# Patient Record
Sex: FEMALE | Race: WHITE | NOT HISPANIC OR LATINO | Employment: FULL TIME | URBAN - NONMETROPOLITAN AREA
[De-identification: names, ages, dates, MRNs, and addresses within clinical notes are randomized per-mention and may not be internally consistent; named-entity substitution may affect disease eponyms.]

---

## 2023-07-26 LAB
ABO GROUP (TYPE) IN BLOOD: NORMAL
ANTIBODY SCREEN: NORMAL
CHORIOGONADOTROPIN (MIU/ML) IN SER/PLAS: ABNORMAL MIU/ML
ERYTHROCYTE DISTRIBUTION WIDTH (RATIO) BY AUTOMATED COUNT: 12.7 % (ref 11.5–14.5)
ERYTHROCYTE MEAN CORPUSCULAR HEMOGLOBIN CONCENTRATION (G/DL) BY AUTOMATED: 33.2 G/DL (ref 32–36)
ERYTHROCYTE MEAN CORPUSCULAR VOLUME (FL) BY AUTOMATED COUNT: 93 FL (ref 80–100)
ERYTHROCYTES (10*6/UL) IN BLOOD BY AUTOMATED COUNT: 4.24 X10E12/L (ref 4–5.2)
HEMATOCRIT (%) IN BLOOD BY AUTOMATED COUNT: 39.5 % (ref 36–46)
HEMOGLOBIN (G/DL) IN BLOOD: 13.1 G/DL (ref 12–16)
LEUKOCYTES (10*3/UL) IN BLOOD BY AUTOMATED COUNT: 6.8 X10E9/L (ref 4.4–11.3)
PLATELETS (10*3/UL) IN BLOOD AUTOMATED COUNT: 269 X10E9/L (ref 150–450)
REFLEX ADDED, ANEMIA PANEL: NORMAL
RH FACTOR: NORMAL

## 2023-08-01 ENCOUNTER — HOSPITAL ENCOUNTER (OUTPATIENT)
Dept: DATA CONVERSION | Facility: HOSPITAL | Age: 23
End: 2023-08-01
Attending: OBSTETRICS & GYNECOLOGY

## 2023-08-01 DIAGNOSIS — Z3A.01 LESS THAN 8 WEEKS GESTATION OF PREGNANCY (HHS-HCC): ICD-10-CM

## 2023-08-01 DIAGNOSIS — O21.0 MILD HYPEREMESIS GRAVIDARUM (HHS-HCC): ICD-10-CM

## 2023-08-01 LAB
ALANINE AMINOTRANSFERASE (SGPT) (U/L) IN SER/PLAS: 23 U/L (ref 7–45)
ALBUMIN (G/DL) IN SER/PLAS: 4.3 G/DL (ref 3.4–5)
ALKALINE PHOSPHATASE (U/L) IN SER/PLAS: 29 U/L (ref 33–110)
ANION GAP IN SER/PLAS: 10 MMOL/L (ref 10–20)
APPEARANCE, URINE: ABNORMAL
ASPARTATE AMINOTRANSFERASE (SGOT) (U/L) IN SER/PLAS: 18 U/L (ref 9–39)
BACTERIA, URINE: ABNORMAL /HPF
BILIRUBIN TOTAL (MG/DL) IN SER/PLAS: 0.5 MG/DL (ref 0–1.2)
BILIRUBIN, URINE: NEGATIVE
BLOOD, URINE: NEGATIVE
CALCIUM (MG/DL) IN SER/PLAS: 9.1 MG/DL (ref 8.6–10.3)
CARBON DIOXIDE, TOTAL (MMOL/L) IN SER/PLAS: 25 MMOL/L (ref 21–32)
CHLORIDE (MMOL/L) IN SER/PLAS: 104 MMOL/L (ref 98–107)
COLOR, URINE: ABNORMAL
CREATININE (MG/DL) IN SER/PLAS: 0.59 MG/DL (ref 0.5–1.05)
ERYTHROCYTE DISTRIBUTION WIDTH (RATIO) BY AUTOMATED COUNT: 12.5 % (ref 11.5–14.5)
ERYTHROCYTE MEAN CORPUSCULAR HEMOGLOBIN CONCENTRATION (G/DL) BY AUTOMATED: 34.3 G/DL (ref 32–36)
ERYTHROCYTE MEAN CORPUSCULAR VOLUME (FL) BY AUTOMATED COUNT: 91 FL (ref 80–100)
ERYTHROCYTES (10*6/UL) IN BLOOD BY AUTOMATED COUNT: 4.18 X10E12/L (ref 4–5.2)
GFR FEMALE: >90 ML/MIN/1.73M2
GLUCOSE (MG/DL) IN SER/PLAS: 78 MG/DL (ref 74–99)
GLUCOSE, URINE: NEGATIVE MG/DL
HEMATOCRIT (%) IN BLOOD BY AUTOMATED COUNT: 38.2 % (ref 36–46)
HEMOGLOBIN (G/DL) IN BLOOD: 13.1 G/DL (ref 12–16)
KETONES, URINE: NEGATIVE MG/DL
LEUKOCYTE ESTERASE, URINE: ABNORMAL
LEUKOCYTES (10*3/UL) IN BLOOD BY AUTOMATED COUNT: 8.2 X10E9/L (ref 4.4–11.3)
MUCUS, URINE: ABNORMAL /LPF
NITRITE, URINE: NEGATIVE
PH, URINE: 7 (ref 5–8)
PLATELETS (10*3/UL) IN BLOOD AUTOMATED COUNT: 260 X10E9/L (ref 150–450)
POTASSIUM (MMOL/L) IN SER/PLAS: 4 MMOL/L (ref 3.5–5.3)
PROTEIN TOTAL: 7.3 G/DL (ref 6.4–8.2)
PROTEIN, URINE: ABNORMAL MG/DL
RBC, URINE: 5 /HPF (ref 0–5)
SODIUM (MMOL/L) IN SER/PLAS: 135 MMOL/L (ref 136–145)
SPECIFIC GRAVITY, URINE: 1.03 (ref 1–1.03)
SQUAMOUS EPITHELIAL CELLS, URINE: 18 /HPF
UREA NITROGEN (MG/DL) IN SER/PLAS: 11 MG/DL (ref 6–23)
UROBILINOGEN, URINE: <2 MG/DL (ref 0–1.9)
WBC CLUMPS, URINE: ABNORMAL /HPF
WBC, URINE: 34 /HPF (ref 0–5)

## 2023-09-29 VITALS — WEIGHT: 129.19 LBS | BODY MASS INDEX: 22.88 KG/M2

## 2023-09-30 NOTE — PROGRESS NOTES
Current Stage:   Stage: Triage     Subjective Data:   Antepartum:  Antepartum:    Patient presented to labor and delivery with hyperemesis      Objective Information:    Objective Information:      T   P  R  BP   MAP  SpO2   Value  37  80  16  106/58   76  99%  Date/Time 8/1 14:30 8/1 16:40 8/1 14:30 8/1 16:40  8/1 16:40 8/1 14:30  Range  (37C - 37C )  (76 - 80 )  (16 - 16 )  (106 - 110 )/ (58 - 71 )  (76 - 84 )  (99% - 99% )  Highest temp of 37 C was recorded at 8/1 14:30    Recent Lab Results:    Results:    CBC: 8/1/2023 15:04              \     Hgb     /                              \     13.1       /  WBC  ----------------  Plt               8.2       ----------------    260              /     Hct     \                              /     38.2       \            RBC: 4.18     MCV: 91           CMP: 8/1/2023 15:04  NA+        Cl-     BUN  /                         135 L   104    11  /  --------------------------------  Glucose                ---------------------------  78    K+     HCO3-   Creat \                         4.0    25    0.59  \           \  T Bili  /                    \  0.5  /  AST  x ---- x ALT        18 x ---- x 23         /  Alk P   \               /  29 L \  Calcium : 9.1     Anion Gap : 10     Albumin : 4.3     T Protein : 7.3           Assessment and Plan:   Assessment:    Patient with hyperemesis presented to labor and delivery.  Treated with IV fluids Zofran.  Labs were unremarkable once able to tolerate p.o. patient was discharged  home.      Electronic Signatures:  Loulou Chase)  (Signed 01-Aug-2023 16:45)   Authored: Current Stage, Subjective Data, Objective Data,  Assessment and Plan, Note Completion      Last Updated: 01-Aug-2023 16:45 by Loulou Chase)

## 2024-03-06 ENCOUNTER — HOSPITAL ENCOUNTER (INPATIENT)
Age: 24
LOS: 3 days | Discharge: HOME | DRG: 540 | End: 2024-03-09
Payer: MEDICAID

## 2024-03-06 VITALS
SYSTOLIC BLOOD PRESSURE: 98 MMHG | HEART RATE: 89 BPM | TEMPERATURE: 97.16 F | DIASTOLIC BLOOD PRESSURE: 58 MMHG | RESPIRATION RATE: 16 BRPM | OXYGEN SATURATION: 98 %

## 2024-03-06 VITALS
SYSTOLIC BLOOD PRESSURE: 122 MMHG | DIASTOLIC BLOOD PRESSURE: 75 MMHG | TEMPERATURE: 98.4 F | RESPIRATION RATE: 18 BRPM | OXYGEN SATURATION: 98 % | HEART RATE: 99 BPM

## 2024-03-06 VITALS
HEART RATE: 82 BPM | SYSTOLIC BLOOD PRESSURE: 111 MMHG | DIASTOLIC BLOOD PRESSURE: 70 MMHG | TEMPERATURE: 98.96 F | RESPIRATION RATE: 17 BRPM | OXYGEN SATURATION: 98 %

## 2024-03-06 VITALS
DIASTOLIC BLOOD PRESSURE: 67 MMHG | TEMPERATURE: 98.42 F | RESPIRATION RATE: 18 BRPM | SYSTOLIC BLOOD PRESSURE: 110 MMHG | HEART RATE: 100 BPM | OXYGEN SATURATION: 98 %

## 2024-03-06 VITALS — BODY MASS INDEX: 30.9 KG/M2

## 2024-03-06 DIAGNOSIS — Z3A.38: ICD-10-CM

## 2024-03-06 DIAGNOSIS — G90.A: ICD-10-CM

## 2024-03-06 DIAGNOSIS — O61.0: ICD-10-CM

## 2024-03-06 DIAGNOSIS — E78.79: ICD-10-CM

## 2024-03-06 DIAGNOSIS — K76.89: ICD-10-CM

## 2024-03-06 LAB
AMPHET UR-MCNC: NEGATIVE NG/ML
BARBITURATE URINE VISTA: NEGATIVE
BENZODIAZEPINE URINE VISTA: NEGATIVE
COCAINE URINE VISTA: NEGATIVE
DEPRECATED RDW RBC: 45.4 FL (ref 35.1–43.9)
DRUG CONFIRMATION TO FOLLOW?: (no result)
ECSTACY URINE VISTA: NEGATIVE
ERYTHROCYTE [DISTWIDTH] IN BLOOD: 14.3 % (ref 11.6–14.6)
HCT VFR BLD AUTO: 31.2 % (ref 37–47)
HGB BLD-MCNC: 10.1 G/DL (ref 12–15)
IMMATURE GRANULOCYTES COUNT: 0.09 X10^3/UL (ref 0–0)
MCV RBC: 86.9 FL (ref 81–99)
MEAN CORP HGB CONC: 32.4 G/DL (ref 32–36)
MEAN PLATELET VOL.: 10.9 FL (ref 6.2–12)
METHADONE URINE VISTA: NEGATIVE
NRBC FLAGGED BY ANALYZER: 0 % (ref 0–5)
PCP UR QL: NEGATIVE
PH UR: 6 [PH]
PLATELET # BLD: 332 K/MM3 (ref 150–450)
RBC # BLD AUTO: 3.59 M/MM3 (ref 4.2–5.4)
THC URINE VISTA: NEGATIVE
WBC # BLD AUTO: 6.5 K/MM3 (ref 4.4–11)

## 2024-03-06 PROCEDURE — 86901 BLOOD TYPING SEROLOGIC RH(D): CPT

## 2024-03-06 PROCEDURE — 59025 FETAL NON-STRESS TEST: CPT

## 2024-03-06 PROCEDURE — G0378 HOSPITAL OBSERVATION PER HR: HCPCS

## 2024-03-06 PROCEDURE — 86850 RBC ANTIBODY SCREEN: CPT

## 2024-03-06 PROCEDURE — 85025 COMPLETE CBC W/AUTO DIFF WBC: CPT

## 2024-03-06 PROCEDURE — 86900 BLOOD TYPING SEROLOGIC ABO: CPT

## 2024-03-06 PROCEDURE — 99221 1ST HOSP IP/OBS SF/LOW 40: CPT

## 2024-03-06 PROCEDURE — 80307 DRUG TEST PRSMV CHEM ANLYZR: CPT

## 2024-03-06 PROCEDURE — 86780 TREPONEMA PALLIDUM: CPT

## 2024-03-06 PROCEDURE — 59050 FETAL MONITOR W/REPORT: CPT

## 2024-03-06 PROCEDURE — 85027 COMPLETE CBC AUTOMATED: CPT

## 2024-03-06 NOTE — PCM.HP.OB
HPI - General
General
Date of Admission: 24
HPI Narrative
ALEN ZAMBRANO, is a 23 F  at 38.2 weeks gestation who presents for scheduled induction of labor for cholestasis.


Maternal Data
Information
JAYLEEN Calculator
 Estimated Delivery Date Method Current WG 
Current Estimate 24 Manual 38w 2d 


[facility]
Medical History (Updated 24 @ 20:20 by Marla Clifford CNM)

Anxiety
Autoimmune disease
Depression
History of depression
Seizures


Home Medications

PNV#14-iron fum-FA#1-dha-docusate 27 mg iron-1 mg-300 mg-50 mg capsule 1 cap PO DAILY Pregnancy 24 [History Last Taken 24 08:00 1 cap]
aspirin 81 mg chewable tablet (Hannah Chewable Low Dose Aspirin) 1 tab PO DAILY pregnancy 24 [History Last Taken 24 08:00 1 TAB]




Allergy/AdvReac Type Severity Reaction Status Date / Time
No Known Allergies Allergy   Verified 24 19:35



ROS
Eyes
Eyes: Denies blurry vision, change in vision or spots in vision
ENT
HEENT: Denies dizziness or headache(s)
Cardiovascular
Cardiovascular: Denies abdominal pain, chest pain or dyspnea
Respiratory/Chest
Respiratory/Chest: Denies cough, dyspnea, shortness of breath at rest or shortness of breath with exertion
Gastrointestinal
Gastrointestinal: Denies abdominal pain, diarrhea or vomiting
Genitourinary
Genitourinary: Denies change in urinary stream, difficulty urinating or dysuria
Musculoskeletal
Musculoskeletal: Reports none
Integumentary
Integumentary: Denies rash
Neurologic
Neurologic: Denies dizziness, headache(s), memory loss or weakness
Psychiatric
Psychiatric: Reports none

Vital Signs
Vital Signs
Vital Signs: 


 24
20:09 24
20:09 24
20:09
Pulse Rate  92 
Blood Pressure 122/75 H  
BP Systolic 122  
BP Diastolic 75  
Pulse Ox   98

Weight

Weight:                        169 lb 4 oz                                       
Body Mass Index (BMI)          30.9                                              




Physical Exam
Const
alert, oriented x3 and no apparent distress
General Appearance: cooperative
Orientation / Consciousness: awake
Exam Limitations: no limitations
HEENT
normocephalic
Head and Scalp: normal to inspection
Eyes
General Eye: normal appearance of both eyes
Neck
full ROM and no lymphadenopathy
Lymph
Lymphatic: no lymphadenopathy noted
Chest
inspection of chest normal
Resp
normal respiratory effort, normal air movement and clear to auscultation bilaterally
Effort and Inspection: able to speak in complete sentences and symmetric chest movement
Cardio
regular rate and regular rhythm
GI
normal to inspection, nondistended, normoactive bowel sounds

Manual OB Exam: fetal presentation cephalic
Back/Spine
normal ROM
Extremity
full ROM and no calf tenderness
Skin
no rashes or lesions noted
General Skin Exam: no breakdown
Neuro
oriented x3 and CN's II-XII intact bilaterally
Psych
mental status grossly normal and thought process normal

Prenatal Labs
Prenatal Labs
Prenatal Labs: 
      No Data to Display


Assessment & Plan
(1) 38 weeks gestation of pregnancy: 
(2) Cholestasis during pregnancy: 
(3) POTS (postural orthostatic tachycardia syndrome): 
(4) History of depression: 
(5) History of marijuana use: 
(6) Encounter for induction of labor: 
PLAN: 
Plan
Admit to labor and delivery
Routine labs
GBS negative
CE 50/-3
Sequeira bulb placed without difficulty and filled with 30 cc N/S
Vistaril 25 mg PO PRN every 8 hours for itching as needed
Epidural when indicated
Pitocin 2mu/min IV and increase per orders
Dr. Pathak aware of admission and plan of care and is collaborating physician

## 2024-03-06 NOTE — HP.PCM.OB_ITS
HPI - General    
General    
Date of Admission: 24    
HPI Narrative    
ALEN ZAMBRANO, is a 23 F  at 38.2 weeks gestation who presents for scheduled  
induction of labor for cholestasis.    
    
    
Maternal Data    
Information    
JAYLEEN Calculator    
    
    
                                Estimated Delivery Date Method          Current     
WG    
     
             Current Estimate 24     Manual       38w 2d           
    
    
    
[facility]    
Medical History (Updated 24 @ 20:20 by Marla Clifford CNM)    
    
Anxiety    
Autoimmune disease    
Depression    
History of depression    
Seizures    
    
    
                                Home Medications    
    
PNV#14-iron fum-FA#1-dha-docusate 27 mg iron-1 mg-300 mg-50 mg capsule 1 cap PO   
DAILY Pregnancy 24 [History Last Taken 24 08:00 1 cap]    
aspirin 81 mg chewable tablet (Hannah Chewable Low Dose Aspirin) 1 tab PO DAILY   
pregnancy 24 [History Last Taken 24 08:00 1 TAB]    
    
    
                                            
    
    
    
Allergy/AdvReac Type Severity Reaction Status Date / Time    
     
No Known Allergies Allergy   Verified 24 19:35    
    
    
    
    
ROS    
Eyes    
Eyes: Denies blurry vision, change in vision or spots in vision    
ENT    
HEENT: Denies dizziness or headache(s)    
Cardiovascular    
Cardiovascular: Denies abdominal pain, chest pain or dyspnea    
Respiratory/Chest    
Respiratory/Chest: Denies cough, dyspnea, shortness of breath at rest or   
shortness of breath with exertion    
Gastrointestinal    
Gastrointestinal: Denies abdominal pain, diarrhea or vomiting    
Genitourinary    
Genitourinary: Denies change in urinary stream, difficulty urinating or dysuria    
Musculoskeletal    
Musculoskeletal: Reports none    
Integumentary    
Integumentary: Denies rash    
Neurologic    
Neurologic: Denies dizziness, headache(s), memory loss or weakness    
Psychiatric    
Psychiatric: Reports none    
    
Vital Signs    
Vital Signs    
Vital Signs:     
                                            
    
    
    
 24    
20:09 24    
20:09 24    
20:09    
     
Pulse Rate  92     
     
Blood Pressure 122/75 H      
     
BP Systolic 122      
     
BP Diastolic 75      
     
Pulse Ox   98    
    
    
                                     Weight    
    
    
    
Weight:                        169 lb 4 oz                                        
    
     
Body Mass Index (BMI)          30.9                                               
    
    
    
    
    
    
Physical Exam    
Const    
alert, oriented x3 and no apparent distress    
General Appearance: cooperative    
Orientation / Consciousness: awake    
Exam Limitations: no limitations    
HEENT    
normocephalic    
Head and Scalp: normal to inspection    
Eyes    
General Eye: normal appearance of both eyes    
Neck    
full ROM and no lymphadenopathy    
Lymph    
Lymphatic: no lymphadenopathy noted    
Chest    
inspection of chest normal    
Resp    
normal respiratory effort, normal air movement and clear to auscultation   
bilaterally    
Effort and Inspection: able to speak in complete sentences and symmetric chest   
movement    
Cardio    
regular rate and regular rhythm    
GI    
normal to inspection, nondistended, normoactive bowel sounds    
    
Manual OB Exam: fetal presentation cephalic    
Back/Spine    
normal ROM    
Extremity    
full ROM and no calf tenderness    
Skin    
no rashes or lesions noted    
General Skin Exam: no breakdown    
Neuro    
oriented x3 and CN's II-XII intact bilaterally    
Psych    
mental status grossly normal and thought process normal    
    
Prenatal Labs    
Prenatal Labs    
Prenatal Labs:     
    
    
                                        No Data to Display    
    
    
    
Assessment & Plan    
(1) 38 weeks gestation of pregnancy:     
(2) Cholestasis during pregnancy:     
(3) POTS (postural orthostatic tachycardia syndrome):     
(4) History of depression:     
(5) History of marijuana use:     
(6) Encounter for induction of labor:     
PLAN:     
Plan    
Admit to labor and delivery    
Routine labs    
GBS negative    
CE 50/-3    
Sequeira bulb placed without difficulty and filled with 30 cc N/S    
Vistaril 25 mg PO PRN every 8 hours for itching as needed    
Epidural when indicated    
Pitocin 2mu/min IV and increase per orders    
Dr. Pathak aware of admission and plan of care and is collaborating physician

## 2024-03-07 VITALS — OXYGEN SATURATION: 100 % | HEART RATE: 85 BPM

## 2024-03-07 VITALS — HEART RATE: 86 BPM | SYSTOLIC BLOOD PRESSURE: 114 MMHG | DIASTOLIC BLOOD PRESSURE: 65 MMHG | OXYGEN SATURATION: 98 %

## 2024-03-07 VITALS
RESPIRATION RATE: 16 BRPM | OXYGEN SATURATION: 97 % | DIASTOLIC BLOOD PRESSURE: 66 MMHG | TEMPERATURE: 98.42 F | HEART RATE: 111 BPM | SYSTOLIC BLOOD PRESSURE: 127 MMHG

## 2024-03-07 VITALS
SYSTOLIC BLOOD PRESSURE: 99 MMHG | RESPIRATION RATE: 16 BRPM | HEART RATE: 93 BPM | DIASTOLIC BLOOD PRESSURE: 57 MMHG | TEMPERATURE: 98.6 F

## 2024-03-07 VITALS — OXYGEN SATURATION: 98 % | HEART RATE: 98 BPM

## 2024-03-07 VITALS
DIASTOLIC BLOOD PRESSURE: 77 MMHG | TEMPERATURE: 97.16 F | OXYGEN SATURATION: 95 % | HEART RATE: 120 BPM | SYSTOLIC BLOOD PRESSURE: 131 MMHG | RESPIRATION RATE: 19 BRPM

## 2024-03-07 VITALS
OXYGEN SATURATION: 96 % | DIASTOLIC BLOOD PRESSURE: 82 MMHG | TEMPERATURE: 98.24 F | SYSTOLIC BLOOD PRESSURE: 131 MMHG | RESPIRATION RATE: 16 BRPM | HEART RATE: 125 BPM

## 2024-03-07 VITALS — HEART RATE: 84 BPM | OXYGEN SATURATION: 98 %

## 2024-03-07 VITALS
OXYGEN SATURATION: 97 % | TEMPERATURE: 97.6 F | DIASTOLIC BLOOD PRESSURE: 70 MMHG | RESPIRATION RATE: 16 BRPM | SYSTOLIC BLOOD PRESSURE: 117 MMHG | HEART RATE: 126 BPM

## 2024-03-07 VITALS — HEART RATE: 92 BPM | DIASTOLIC BLOOD PRESSURE: 73 MMHG | SYSTOLIC BLOOD PRESSURE: 116 MMHG | RESPIRATION RATE: 19 BRPM

## 2024-03-07 VITALS
OXYGEN SATURATION: 94 % | HEART RATE: 115 BPM | SYSTOLIC BLOOD PRESSURE: 124 MMHG | DIASTOLIC BLOOD PRESSURE: 66 MMHG | RESPIRATION RATE: 2 BRPM

## 2024-03-07 VITALS — OXYGEN SATURATION: 98 % | HEART RATE: 92 BPM

## 2024-03-07 VITALS — RESPIRATION RATE: 18 BRPM | HEART RATE: 111 BPM | SYSTOLIC BLOOD PRESSURE: 111 MMHG | DIASTOLIC BLOOD PRESSURE: 75 MMHG

## 2024-03-07 VITALS — HEART RATE: 95 BPM | OXYGEN SATURATION: 97 %

## 2024-03-07 VITALS
RESPIRATION RATE: 16 BRPM | DIASTOLIC BLOOD PRESSURE: 55 MMHG | SYSTOLIC BLOOD PRESSURE: 110 MMHG | TEMPERATURE: 98.78 F | HEART RATE: 85 BPM | OXYGEN SATURATION: 97 %

## 2024-03-07 VITALS — HEART RATE: 95 BPM | OXYGEN SATURATION: 99 % | DIASTOLIC BLOOD PRESSURE: 69 MMHG | SYSTOLIC BLOOD PRESSURE: 116 MMHG

## 2024-03-07 VITALS — DIASTOLIC BLOOD PRESSURE: 66 MMHG | HEART RATE: 104 BPM | SYSTOLIC BLOOD PRESSURE: 127 MMHG

## 2024-03-07 VITALS
TEMPERATURE: 98.24 F | DIASTOLIC BLOOD PRESSURE: 66 MMHG | RESPIRATION RATE: 20 BRPM | SYSTOLIC BLOOD PRESSURE: 113 MMHG | OXYGEN SATURATION: 94 % | HEART RATE: 110 BPM

## 2024-03-07 VITALS — OXYGEN SATURATION: 98 % | HEART RATE: 91 BPM

## 2024-03-07 VITALS — HEART RATE: 100 BPM | OXYGEN SATURATION: 99 %

## 2024-03-07 VITALS
SYSTOLIC BLOOD PRESSURE: 112 MMHG | OXYGEN SATURATION: 99 % | TEMPERATURE: 97.52 F | DIASTOLIC BLOOD PRESSURE: 68 MMHG | HEART RATE: 72 BPM | RESPIRATION RATE: 16 BRPM

## 2024-03-07 VITALS
TEMPERATURE: 99.32 F | SYSTOLIC BLOOD PRESSURE: 112 MMHG | RESPIRATION RATE: 16 BRPM | DIASTOLIC BLOOD PRESSURE: 72 MMHG | HEART RATE: 100 BPM

## 2024-03-07 VITALS — SYSTOLIC BLOOD PRESSURE: 121 MMHG | DIASTOLIC BLOOD PRESSURE: 81 MMHG | HEART RATE: 112 BPM | RESPIRATION RATE: 18 BRPM

## 2024-03-07 VITALS — SYSTOLIC BLOOD PRESSURE: 101 MMHG | HEART RATE: 88 BPM | DIASTOLIC BLOOD PRESSURE: 63 MMHG | RESPIRATION RATE: 16 BRPM

## 2024-03-07 VITALS
SYSTOLIC BLOOD PRESSURE: 121 MMHG | DIASTOLIC BLOOD PRESSURE: 66 MMHG | OXYGEN SATURATION: 94 % | RESPIRATION RATE: 19 BRPM | HEART RATE: 117 BPM

## 2024-03-07 VITALS — RESPIRATION RATE: 18 BRPM | SYSTOLIC BLOOD PRESSURE: 124 MMHG | HEART RATE: 102 BPM | DIASTOLIC BLOOD PRESSURE: 78 MMHG

## 2024-03-07 VITALS
TEMPERATURE: 99 F | SYSTOLIC BLOOD PRESSURE: 113 MMHG | RESPIRATION RATE: 16 BRPM | DIASTOLIC BLOOD PRESSURE: 70 MMHG | HEART RATE: 95 BPM

## 2024-03-07 VITALS — DIASTOLIC BLOOD PRESSURE: 57 MMHG | SYSTOLIC BLOOD PRESSURE: 98 MMHG | HEART RATE: 90 BPM | RESPIRATION RATE: 16 BRPM

## 2024-03-07 VITALS — DIASTOLIC BLOOD PRESSURE: 57 MMHG | SYSTOLIC BLOOD PRESSURE: 101 MMHG | RESPIRATION RATE: 16 BRPM | HEART RATE: 95 BPM

## 2024-03-07 VITALS — OXYGEN SATURATION: 99 % | HEART RATE: 107 BPM

## 2024-03-07 VITALS — RESPIRATION RATE: 16 BRPM | HEART RATE: 83 BPM | SYSTOLIC BLOOD PRESSURE: 106 MMHG | DIASTOLIC BLOOD PRESSURE: 56 MMHG

## 2024-03-07 VITALS
TEMPERATURE: 98.4 F | OXYGEN SATURATION: 96 % | DIASTOLIC BLOOD PRESSURE: 55 MMHG | SYSTOLIC BLOOD PRESSURE: 115 MMHG | HEART RATE: 89 BPM | RESPIRATION RATE: 16 BRPM

## 2024-03-07 VITALS — OXYGEN SATURATION: 100 % | HEART RATE: 107 BPM

## 2024-03-07 VITALS — HEART RATE: 90 BPM | OXYGEN SATURATION: 100 %

## 2024-03-07 VITALS
RESPIRATION RATE: 16 BRPM | HEART RATE: 125 BPM | OXYGEN SATURATION: 97 % | SYSTOLIC BLOOD PRESSURE: 115 MMHG | DIASTOLIC BLOOD PRESSURE: 81 MMHG | TEMPERATURE: 98.24 F

## 2024-03-07 VITALS
RESPIRATION RATE: 14 BRPM | SYSTOLIC BLOOD PRESSURE: 111 MMHG | DIASTOLIC BLOOD PRESSURE: 66 MMHG | OXYGEN SATURATION: 94 % | HEART RATE: 115 BPM

## 2024-03-07 VITALS — OXYGEN SATURATION: 100 % | HEART RATE: 92 BPM

## 2024-03-07 VITALS — DIASTOLIC BLOOD PRESSURE: 69 MMHG | RESPIRATION RATE: 19 BRPM | SYSTOLIC BLOOD PRESSURE: 127 MMHG | HEART RATE: 94 BPM

## 2024-03-07 VITALS — OXYGEN SATURATION: 93 % | HEART RATE: 109 BPM

## 2024-03-07 VITALS
SYSTOLIC BLOOD PRESSURE: 111 MMHG | RESPIRATION RATE: 16 BRPM | OXYGEN SATURATION: 97 % | TEMPERATURE: 98.42 F | HEART RATE: 113 BPM | DIASTOLIC BLOOD PRESSURE: 82 MMHG

## 2024-03-07 VITALS — HEART RATE: 99 BPM | OXYGEN SATURATION: 97 %

## 2024-03-07 VITALS
OXYGEN SATURATION: 95 % | RESPIRATION RATE: 16 BRPM | DIASTOLIC BLOOD PRESSURE: 70 MMHG | HEART RATE: 119 BPM | SYSTOLIC BLOOD PRESSURE: 127 MMHG

## 2024-03-07 VITALS — HEART RATE: 94 BPM | OXYGEN SATURATION: 97 %

## 2024-03-07 VITALS — OXYGEN SATURATION: 92 % | HEART RATE: 99 BPM | TEMPERATURE: 98.2 F

## 2024-03-07 VITALS — HEART RATE: 120 BPM | OXYGEN SATURATION: 97 % | RESPIRATION RATE: 16 BRPM

## 2024-03-07 VITALS — DIASTOLIC BLOOD PRESSURE: 56 MMHG | RESPIRATION RATE: 16 BRPM | SYSTOLIC BLOOD PRESSURE: 102 MMHG | HEART RATE: 90 BPM

## 2024-03-07 VITALS
SYSTOLIC BLOOD PRESSURE: 109 MMHG | HEART RATE: 104 BPM | TEMPERATURE: 97.88 F | DIASTOLIC BLOOD PRESSURE: 76 MMHG | RESPIRATION RATE: 16 BRPM | OXYGEN SATURATION: 94 %

## 2024-03-07 VITALS — OXYGEN SATURATION: 99 % | HEART RATE: 89 BPM

## 2024-03-07 VITALS — SYSTOLIC BLOOD PRESSURE: 119 MMHG | DIASTOLIC BLOOD PRESSURE: 75 MMHG | RESPIRATION RATE: 19 BRPM | HEART RATE: 106 BPM

## 2024-03-07 VITALS — TEMPERATURE: 98.1 F | RESPIRATION RATE: 16 BRPM

## 2024-03-07 VITALS — RESPIRATION RATE: 16 BRPM | TEMPERATURE: 99.5 F

## 2024-03-07 NOTE — PN.OBGYN_ITS
Subjective    
Subjective    
Patient seen at bedside. Comfortable with epidural.    
    
Objective Data    
Objective Data    
Vital Signs:     
Vital Signs    
    
    
    
Temp Pulse Resp BP Pulse Ox    
     
 98.6 F   93   16   99/57 L  97     
     
 24 06:18  24 06:18  24 06:18  24 06:18  24 06:17    
    
    
    
    
Weight:                          169 lb 4 oz                                      
       
Body Mass Index (BMI)            30.9                                             
       
    
    
Intake & Output:     
                       Intake and Output for Last 24 Hours    
    
    
    
 24    
    
 23:59 23:59 23:59    
     
Intake Total  9. / 9.07 1893.44 / 1893.44    
     
Output Total   450 / 450    
     
Balance  9. / .07 1443.44 / 1443.44    
    
    
    
Lab / Micro Data    
    
                                                        24 20:20              
    
Labs:     
                         Laboratory Results - last 24 hr    
    
24 20:20: WBC 6.5, RBC 3.59 L, Hgb 10.1 L, Hct 31.2 L, MCV 86.9, MCH 28.1,  
MCHC 32.4, RDW Std Deviation 45.4 H, RDW Coeff of Eric 14.3, Plt Count 332, MPV   
10.9, Immature Gran % (Auto) 1.400 H, Neut % (Auto) 58.6, Lymph % (Auto) 26.8,   
Mono % (Auto) 12.0 H, Eos % (Auto) 0.9, Baso % (Auto) 0.3, Absolute Neuts (auto)  
3.8, Absolute Lymphs (auto) 1.75, Nucleated RBC % 0, Syphilis Total Ab Non-re  
active, Blood Type B POSITIVE, Antibody Screen NEGATIVE    
24 21:30: Urine Opiates Screen NEGATIVE, Urine Methadone Screen NEGATIVE,   
Ur Barbiturates Screen NEGATIVE, Ur Phencyclidine Scrn NEGATIVE, Ur Amphetamines  
Screen NEGATIVE, MDMA (Ecstasy) Screen NEGATIVE, U Benzodiazepines Scrn   
NEGATIVE, Urine Cocaine Screen NEGATIVE, U Cannabinoids Screen NEGATIVE, Ur Drug  
Screen Comment **    
    
    
    
ROS    
Eyes    
Eyes: Denies blurry vision, change in vision or spots in vision    
ENT    
HEENT: Denies dizziness or headache(s)    
Cardiovascular    
Cardiovascular: Denies abdominal pain, chest pain or dyspnea    
Respiratory/Chest    
Respiratory/Chest: Denies cough, dyspnea, shortness of breath at rest or   
shortness of breath with exertion    
Gastrointestinal    
Gastrointestinal: Denies abdominal pain or diarrhea    
Genitourinary    
Genitourinary: Denies change in urinary stream, difficulty urinating or dysuria    
Musculoskeletal    
Musculoskeletal: Reports none    
Integumentary    
Integumentary: Denies rash    
Neurologic    
Neurologic: Denies headache(s), memory loss or weakness    
    
Physical Exam    
Const    
alert and no apparent distress    
General Appearance: cooperative and comfortable    
Exam Limitations: no limitations    
HEENT    
normocephalic    
Eyes    
General Eye: normal appearance of both eyes    
Neck    
full ROM    
General: normal visual inspection    
Chest    
Chest: symmetrical chest wall rise    
Resp    
normal respiratory effort and normal air movement    
Effort and Inspection: symmetric chest movement    
Auscultation: clear to auscultation bilaterally    
Cardio    
regular rate and regular rhythm    
GI    
normal to inspection, nondistended, normoactive bowel sounds    
Back/Spine    
normal ROM    
Extremity    
full ROM and no calf tenderness    
General Extremity: normal exam except as noted    
Skin    
no rashes or lesions noted    
Neuro    
CN's II-XII intact bilaterally    
Psych    
mental status grossly normal    
    
Assessment & Plan    
(1) Encounter for induction of labor:     
(2) History of marijuana use:     
(3) History of depression:     
(4) POTS (postural orthostatic tachycardia syndrome):     
(5) Cholestasis during pregnancy:     
(6) 38 weeks gestation of pregnancy:     
PLAN:     
Plan    
Cat. 1 tracing    
CE /-2    
AROM for clear fluid    
IUPC placed     
Pitocin at 10 mu/min- continue to increase per policy    
Anticipate

## 2024-03-07 NOTE — PCM.PN.OB
Subjective
Subjective
Pt complete and pushing for 4 hours. +1 without further progress. Cat I-II tracing. Maternal exhaustion and desires no further attempt at vaginal delivery. Effort has been complicated by MARTINEZ. Risks and benefits of a  discussed with pt. 
including injury to other organs, infection and bleeding.  

Objective Data
Objective Data
Vital Signs: 
Vital Signs

Temp Pulse Resp BP Pulse Ox
 99.5 F H  95   16   127/66 H  97 
 24 14:11  24 14:16  24 14:11  24 14:12  24 14:16



Weight:                        76.771 kg                                        
Body Mass Index (BMI)          30.9                                             


Intake & Output: 
Intake and Output for Last 24 Hours

 24
 23:59 23:59 23:59
Intake Total  9.07 / 9.07 4301.94 / 4301.94
Output Total   450 / 450
Balance  9.07 / 9.07 3851.94 / 3851.94


Lab / Micro Data
Attestation: I reviewed the patient's lab results.

24 20:20          

Labs: 
Laboratory Results - last 24 hr

24 20:20: WBC 6.5, RBC 3.59 L, Hgb 10.1 L, Hct 31.2 L, MCV 86.9, MCH 28.1, MCHC 32.4, RDW Std Deviation 45.4 H, RDW Coeff of Eric 14.3, Plt Count 332, MPV 10.9, Immature Gran % (Auto) 1.400 H, Neut % (Auto) 58.6, Lymph % (Auto) 26.8, Mono % 
(Auto) 12.0 H, Eos % (Auto) 0.9, Baso % (Auto) 0.3, Absolute Neuts (auto) 3.8, Absolute Lymphs (auto) 1.75, Nucleated RBC % 0, Syphilis Total Ab Non-reactive, Blood Type B POSITIVE, Antibody Screen NEGATIVE
24 21:30: Urine Opiates Screen NEGATIVE, Urine Methadone Screen NEGATIVE, Ur Barbiturates Screen NEGATIVE, Ur Phencyclidine Scrn NEGATIVE, Ur Amphetamines Screen NEGATIVE, MDMA (Ecstasy) Screen NEGATIVE, U Benzodiazepines Scrn NEGATIVE, Urine 
Cocaine Screen NEGATIVE, U Cannabinoids Screen NEGATIVE, Ur Drug Screen Comment **



Physical Exam
Const
alert and oriented x3
General Appearance: cooperative
HEENT
normocephalic and head/scalp atraumatic
Resp
normal respiratory effort

Bladder / Kidney Exam: catheter in place
External Female Exam: normal appearance of the urethra
OB / External & Speculum: other 10/100/+1 caput
Extremity
normal to inspection
Neuro
oriented x3 and CN's II-XII intact bilaterally
Psych
mental status grossly normal

Assessment & Plan
(1) Cholestasis during pregnancy: 
(2) 38 weeks gestation of pregnancy: 
(3) Encounter for induction of labor: 
PLAN: 
Plan
Failure to descend- decision for for primary c/s

## 2024-03-07 NOTE — PN.OBGYN_ITS
Subjective    
Subjective    
Pt complete and pushing for 4 hours. +1 without further progress. Cat I-II   
tracing. Maternal exhaustion and desires no further attempt at vaginal delivery.  
Effort has been complicated by MARTINEZ. Risks and benefits of a    
discussed with pt. including injury to other organs, infection and bleeding.      
    
Objective Data    
Objective Data    
Vital Signs:     
Vital Signs    
    
    
    
Temp Pulse Resp BP Pulse Ox    
     
 99.5 F H  95   16   127/66 H  97     
     
 24 14:11  24 14:16  24 14:11  24 14:12  24 14:16    
    
    
    
    
Weight:                          76.771 kg                                        
       
Body Mass Index (BMI)            30.9                                             
       
    
    
Intake & Output:     
                       Intake and Output for Last 24 Hours    
    
    
    
 24    
    
 23:59 23:59 23:59    
     
Intake Total  9.07 / 9.07 4301.94 / 4301.94    
     
Output Total   450 / 450    
     
Balance  9.07 / 9.07 3851.94 / 3851.94    
    
    
    
Lab / Micro Data    
Attestation: I reviewed the patient's lab results.    
    
                                                        24 20:20              
    
Labs:     
                         Laboratory Results - last 24 hr    
    
24 20:20: WBC 6.5, RBC 3.59 L, Hgb 10.1 L, Hct 31.2 L, MCV 86.9, MCH 28.1,  
MCHC 32.4, RDW Std Deviation 45.4 H, RDW Coeff of Eric 14.3, Plt Count 332, MPV   
10.9, Immature Gran % (Auto) 1.400 H, Neut % (Auto) 58.6, Lymph % (Auto) 26.8,   
Mono % (Auto) 12.0 H, Eos % (Auto) 0.9, Baso % (Auto) 0.3, Absolute Neuts (auto)  
3.8, Absolute Lymphs (auto) 1.75, Nucleated RBC % 0, Syphilis Total Ab Non-  
reactive, Blood Type B POSITIVE, Antibody Screen NEGATIVE    
24 21:30: Urine Opiates Screen NEGATIVE, Urine Methadone Screen NEGATIVE,   
Ur Barbiturates Screen NEGATIVE, Ur Phencyclidine Scrn NEGATIVE, Ur Amphetamines  
Screen NEGATIVE, MDMA (Ecstasy) Screen NEGATIVE, U Benzodiazepines Scrn   
NEGATIVE, Urine Cocaine Screen NEGATIVE, U Cannabinoids Screen NEGATIVE, Ur Drug  
Screen Comment **    
    
    
    
Physical Exam    
Const    
alert and oriented x3    
General Appearance: cooperative    
HEENT    
normocephalic and head/scalp atraumatic    
Resp    
normal respiratory effort    
    
Bladder / Kidney Exam: catheter in place    
External Female Exam: normal appearance of the urethra    
OB / External & Speculum: other 10/100/+1 caput    
Extremity    
normal to inspection    
Neuro    
oriented x3 and CN's II-XII intact bilaterally    
Psych    
mental status grossly normal    
    
Assessment & Plan    
(1) Cholestasis during pregnancy:     
(2) 38 weeks gestation of pregnancy:     
(3) Encounter for induction of labor:     
PLAN:     
Plan    
Failure to descend- decision for for primary c/s

## 2024-03-07 NOTE — OP.PCM_ITS
Assessment & Plan    
(1) Failure of fetal descent in labor, delivered, current hospitalization:     
PLAN: primary     
(2) S/P primary low transverse :    
    
Maternal Data    
Information    
JAYLEEN Calculator    
    
    
                                Estimated Delivery Date Method          Current     
WG    
     
             Current Estimate 24     Manual       38w 3d           
    
    
Final JAYLEEN: 24    
Gestational age: 38+3    
 Doctor Who Attended Delivery: Yakelin Richmond    
    
 Details    
Operative Information    
Date of Procedure: 24    
Pre-Operative Diagnosis: Failure to descend    
Post-Operative Diagnosis: Same, asynclytic and transverse    
Indications for : Failure of Descent    
Indications Narrative:     
IOL for cholestasis. Progressed to complete with Pitocin. Pushed for 4 hours   
without descent past +1 station     
Procedure Type: low transverse     
OR Assistant #1: Joanie Urena    
Type of Anesthesia: Epidural    
Anesthesiologist: Kyle Trotter    
Antibiotic Given: Ancef 2 grams IV x1 and Zithromax 500 mg/5 mL X1    
Drain: Sequeira to straight drain    
Estimated Blood Loss: 300 cc    
Fluids Replaced: 1000    
Procedure Start Time: 17:16    
Procedure Stop Time: 18:02    
Time of Delivery: 17:24    
Findings    
Description of Procedure:     
    
Under epidural anaesthetic with a Sequeira catheter inserted, the patient was prep  
ped and draped in the usual sterile fashion in the supine position with a   
leftward tilt. A Pfannenstiel incision was made. The incision was carried down   
to the fascia with cautery. The fascia was incised transversely and dissected   
off the rectus muscle using sharp dissection. Electrocautery was used for   
hemostasis. The peritoneum was opened taking care not to injure the bladder. The  
vesicouterine peritoneum was dissected off the lower uterine segment. The lower   
segment was assessed and a low transverse incision was made. The uterine   
incision was extended bluntly.    
The fetus was presenting as a vertex, transverse and asynclitic. The head was   
delivered with some difficulty and the rest of the body followed easily.    
The cord was clamped twice and cut and [facility]  
ting the nursing and pediatric staff. Cord gases were attempted but could not be  
obtained. The placenta was then delivered with uterine massage. The uterus was   
explored and was empty of all tissue. The uterus was exteriorized for better   
visualization. The uterine incision was then closed in two layers with 0-Vicryl   
suture. The first layer was locking and the second was imbricating. Tubes and   
ovaries were examined and appeared normal.    
The peritoneum was closed with a running Monocryl stitch. The fascia was closed   
with Vicryl 0 in a running unlocked fashion. The skin was then reapproximated   
with a running Vicryl subcuticular suture).    
    
At the end of the procedure all sponges, instruments, and sharps were counted   
and correct. Estimated blood loss was 300 ml. The patient and baby were taken to  
the recovery in stable condition. A female baby 1802 were 2, 5 and 10 at 1, 5   
and 10 minutes respectively.     
    
    
Fetal Presentation: Positive for Vertex    
Amniotic Membrane Rupture Type: Artificial    
Time of Membrane Ruptured: 0636    
Amniotic Fluid Description: Clear    
Placental Delivery Description: Expressed    
Placenta Disposition: [facility]    
Fetal Cord Vessel Description: 3 Vessels    
Cord Entanglement: None    
Nuchal Cord Compression: Without compression    
Cord Gases: ABG (attempted but could not be collected)    
Infant A Gender: Female    
Apgar (1 minute): 2    
Apgar (5 minute): 5 (10 min 10)    
Delayed Cord Clamping: No

## 2024-03-07 NOTE — EX.PCM.OBRPT
Assessment & Plan
(1) Failure of fetal descent in labor, delivered, current hospitalization: 
PLAN: primary 
(2) S/P primary low transverse :

Maternal Data
Information
JAYLEEN Calculator
 Estimated Delivery Date Method Current WG 
Current Estimate 24 Manual 38w 3d 

Final JAYLEEN: 24
Gestational age: 38+3
 Doctor Who Attended Delivery: Yakelin Richmond

 Details
Operative Information
Date of Procedure: 24
Pre-Operative Diagnosis: Failure to descend
Post-Operative Diagnosis: Same, asynclytic and transverse
Indications for : Failure of Descent
Indications Narrative: 
IOL for cholestasis. Progressed to complete with Pitocin. Pushed for 4 hours without descent past +1 station 
Procedure Type: low transverse 
OR Assistant #1: Joanie Urena
Type of Anesthesia: Epidural
Anesthesiologist: Kyle Trotter
Antibiotic Given: Ancef 2 grams IV x1 and Zithromax 500 mg/5 mL X1
Drain: Sequeira to straight drain
Estimated Blood Loss: 300 cc
Fluids Replaced: 1000
Procedure Start Time: 17:16
Procedure Stop Time: 18:02
Time of Delivery: 17:24
Findings
Description of Procedure: 

Under epidural anaesthetic with a Sequeira catheter inserted, the patient was prepped and draped in the usual sterile fashion in the supine position with a leftward tilt. A Pfannenstiel incision was made. The incision was carried down to the fascia 
with cautery. The fascia was incised transversely and dissected off the rectus muscle using sharp dissection. Electrocautery was used for hemostasis. The peritoneum was opened taking care not to injure the bladder. The vesicouterine peritoneum was 
dissected off the lower uterine segment. The lower segment was assessed and a low transverse incision was made. The uterine incision was extended bluntly.
The fetus was presenting as a vertex, transverse and asynclitic. The head was delivered with some difficulty and the rest of the body followed easily.
The cord was clamped twice and cut and the baby transferred to the warmer, awaiting the nursing and pediatric staff. Cord gases were attempted but could not be obtained. The placenta was then delivered with uterine massage. The uterus was explored 
and was empty of all tissue. The uterus was exteriorized for better visualization. The uterine incision was then closed in two layers with 0-Vicryl suture. The first layer was locking and the second was imbricating. Tubes and ovaries were examined 
and appeared normal.
The peritoneum was closed with a running Monocryl stitch. The fascia was closed with Vicryl 0 in a running unlocked fashion. The skin was then reapproximated with a running Vicryl subcuticular suture).

At the end of the procedure all sponges, instruments, and sharps were counted and correct. Estimated blood loss was 300 ml. The patient and baby were taken to the recovery in stable condition. A female baby 1802 were 2, 5 and 10 at 1, 5 and 10 
minutes respectively. 


Fetal Presentation: Positive for Vertex
Amniotic Membrane Rupture Type: Artificial
Time of Membrane Ruptured: 0636
Amniotic Fluid Description: Clear
Placental Delivery Description: Expressed
Placenta Disposition: [facility]
Fetal Cord Vessel Description: 3 Vessels
Cord Entanglement: None
Nuchal Cord Compression: Without compression
Cord Gases: ABG (attempted but could not be collected)
Infant A Gender: Female
Apgar (1 minute): 2
Apgar (5 minute): 5 (10 min 10)
Delayed Cord Clamping: No

## 2024-03-07 NOTE — PCM.PN.CNM
Subjective
Subjective
Patient seen at bedside. Comfortable with epidural.

Objective Data
Objective Data
Vital Signs: 
Vital Signs

Temp Pulse Resp BP Pulse Ox
 98.6 F   93   16   99/57 L  97 
 24 06:18  24 06:18  24 06:18  24 06:18  24 06:17



Weight:                        169 lb 4 oz                                      
Body Mass Index (BMI)          30.9                                             


Intake & Output: 
Intake and Output for Last 24 Hours

 24
 23:59 23:59 23:59
Intake Total  9. / .07 1893.44 / 1893.44
Output Total   450 / 450
Balance  9. / .07 1443.44 / 1443.44


Lab / Micro Data

24 20:20          

Labs: 
Laboratory Results - last 24 hr

24 20:20: WBC 6.5, RBC 3.59 L, Hgb 10.1 L, Hct 31.2 L, MCV 86.9, MCH 28.1, MCHC 32.4, RDW Std Deviation 45.4 H, RDW Coeff of Eric 14.3, Plt Count 332, MPV 10.9, Immature Gran % (Auto) 1.400 H, Neut % (Auto) 58.6, Lymph % (Auto) 26.8, Mono % 
(Auto) 12.0 H, Eos % (Auto) 0.9, Baso % (Auto) 0.3, Absolute Neuts (auto) 3.8, Absolute Lymphs (auto) 1.75, Nucleated RBC % 0, Syphilis Total Ab Non-reactive, Blood Type B POSITIVE, Antibody Screen NEGATIVE
24 21:30: Urine Opiates Screen NEGATIVE, Urine Methadone Screen NEGATIVE, Ur Barbiturates Screen NEGATIVE, Ur Phencyclidine Scrn NEGATIVE, Ur Amphetamines Screen NEGATIVE, MDMA (Ecstasy) Screen NEGATIVE, U Benzodiazepines Scrn NEGATIVE, Urine 
Cocaine Screen NEGATIVE, U Cannabinoids Screen NEGATIVE, Ur Drug Screen Comment **



ROS
Eyes
Eyes: Denies blurry vision, change in vision or spots in vision
ENT
HEENT: Denies dizziness or headache(s)
Cardiovascular
Cardiovascular: Denies abdominal pain, chest pain or dyspnea
Respiratory/Chest
Respiratory/Chest: Denies cough, dyspnea, shortness of breath at rest or shortness of breath with exertion
Gastrointestinal
Gastrointestinal: Denies abdominal pain or diarrhea
Genitourinary
Genitourinary: Denies change in urinary stream, difficulty urinating or dysuria
Musculoskeletal
Musculoskeletal: Reports none
Integumentary
Integumentary: Denies rash
Neurologic
Neurologic: Denies headache(s), memory loss or weakness

Physical Exam
Const
alert and no apparent distress
General Appearance: cooperative and comfortable
Exam Limitations: no limitations
HEENT
normocephalic
Eyes
General Eye: normal appearance of both eyes
Neck
full ROM
General: normal visual inspection
Chest
Chest: symmetrical chest wall rise
Resp
normal respiratory effort and normal air movement
Effort and Inspection: symmetric chest movement
Auscultation: clear to auscultation bilaterally
Cardio
regular rate and regular rhythm
GI
normal to inspection, nondistended, normoactive bowel sounds
Back/Spine
normal ROM
Extremity
full ROM and no calf tenderness
General Extremity: normal exam except as noted
Skin
no rashes or lesions noted
Neuro
CN's II-XII intact bilaterally
Psych
mental status grossly normal

Assessment & Plan
(1) Encounter for induction of labor: 
(2) History of marijuana use: 
(3) History of depression: 
(4) POTS (postural orthostatic tachycardia syndrome): 
(5) Cholestasis during pregnancy: 
(6) 38 weeks gestation of pregnancy: 
PLAN: 
Plan
Cat. 1 tracing
CE /-2
AROM for clear fluid
IUPC placed 
Pitocin at 10 mu/min- continue to increase per policy
Anticipate

## 2024-03-08 VITALS
SYSTOLIC BLOOD PRESSURE: 105 MMHG | DIASTOLIC BLOOD PRESSURE: 65 MMHG | HEART RATE: 95 BPM | OXYGEN SATURATION: 96 % | TEMPERATURE: 97.16 F | RESPIRATION RATE: 18 BRPM

## 2024-03-08 VITALS — OXYGEN SATURATION: 96 % | RESPIRATION RATE: 14 BRPM | HEART RATE: 109 BPM

## 2024-03-08 VITALS
DIASTOLIC BLOOD PRESSURE: 69 MMHG | HEART RATE: 75 BPM | SYSTOLIC BLOOD PRESSURE: 105 MMHG | OXYGEN SATURATION: 97 % | TEMPERATURE: 97.1 F | RESPIRATION RATE: 16 BRPM

## 2024-03-08 VITALS
SYSTOLIC BLOOD PRESSURE: 102 MMHG | RESPIRATION RATE: 16 BRPM | TEMPERATURE: 97.52 F | HEART RATE: 89 BPM | OXYGEN SATURATION: 95 % | DIASTOLIC BLOOD PRESSURE: 72 MMHG

## 2024-03-08 VITALS — HEART RATE: 115 BPM | RESPIRATION RATE: 16 BRPM | OXYGEN SATURATION: 96 %

## 2024-03-08 VITALS
TEMPERATURE: 98.24 F | SYSTOLIC BLOOD PRESSURE: 94 MMHG | OXYGEN SATURATION: 95 % | RESPIRATION RATE: 16 BRPM | HEART RATE: 98 BPM | DIASTOLIC BLOOD PRESSURE: 62 MMHG

## 2024-03-08 VITALS — RESPIRATION RATE: 16 BRPM | HEART RATE: 104 BPM | OXYGEN SATURATION: 95 %

## 2024-03-08 VITALS
TEMPERATURE: 97.6 F | HEART RATE: 113 BPM | OXYGEN SATURATION: 97 % | SYSTOLIC BLOOD PRESSURE: 108 MMHG | DIASTOLIC BLOOD PRESSURE: 58 MMHG | RESPIRATION RATE: 16 BRPM

## 2024-03-08 VITALS
HEART RATE: 107 BPM | SYSTOLIC BLOOD PRESSURE: 110 MMHG | DIASTOLIC BLOOD PRESSURE: 76 MMHG | TEMPERATURE: 97.34 F | RESPIRATION RATE: 16 BRPM | OXYGEN SATURATION: 97 %

## 2024-03-08 VITALS
SYSTOLIC BLOOD PRESSURE: 96 MMHG | OXYGEN SATURATION: 96 % | TEMPERATURE: 98.7 F | RESPIRATION RATE: 16 BRPM | DIASTOLIC BLOOD PRESSURE: 66 MMHG | HEART RATE: 75 BPM

## 2024-03-08 LAB
DEPRECATED RDW RBC: 46.9 FL (ref 35.1–43.9)
ERYTHROCYTE [DISTWIDTH] IN BLOOD: 14.7 % (ref 11.6–14.6)
HCT VFR BLD AUTO: 26.9 % (ref 37–47)
HGB BLD-MCNC: 8.7 G/DL (ref 12–15)
MCV RBC: 87.9 FL (ref 81–99)
MEAN CORP HGB CONC: 32.3 G/DL (ref 32–36)
MEAN PLATELET VOL.: 10.7 FL (ref 6.2–12)
PLATELET # BLD: 238 K/MM3 (ref 150–450)
RBC # BLD AUTO: 3.06 M/MM3 (ref 4.2–5.4)
WBC # BLD AUTO: 17.1 K/MM3 (ref 4.4–11)

## 2024-03-08 NOTE — NURSING
Verbal conversation with Dr. Magana about patient infiltrated IV and provider states we can begin oral motrin at this time.

## 2024-03-08 NOTE — CASEMGMT
Social Work Assessment
Labor and Delivery Unit

Patient Address:[address] [place] [id number]
Phone number: [phone]
Date of Referral: 3/8/24
Time of Referral:? 0157
Referred By: Marla Clifford
Date of Intervention: ??3/8/24
Time of Intervention:? 1400

Reason for Referral:?  hx of anxiety, depression, THC  

Sw completed chart review and acknowledges social work consult. Sw presented to bedside and introduced self to mother of baby (MOB- Ngoc) and father of baby (FOB- Humberto). Sw explained reason for sw involvement and completed psychosocial assessment. 

History obtained from: medical records, MOB and FOB 

Household composition: Currently residing in the home is MOB, FOB and now  baby. Parents deny any issues or concerns with their housing at this time. 

Patient's parent/guardian status:? ?MOB states that she and BRONSON have known each other for a long time. FOB states that he was living a life that he was not proud of, and even though he knew that MOB wanted to be in a relationship with him he was not 
ready. BRONSON states that he worked on himself and then got reconnected with MOB. They have been together for 2 years. Menard baby is first baby for both parents. 

Medical History: ?BLAKE is 23 year old  female who is  1, para 0- now 1 following labor and delivery of . BLAKE received routine prenatal care during pregnancy with [facility]. BLAKE presented to hospital and delivered baby 
via  at 38 weeks gestation on 3/7/24. [place]ard baby, Ellis was born weighing 6lb 15oz and her apgars were 2, 5 and 10 at 1, 5 and 10 minutes of life, respectfully. BLAKE states that breast feeding is going well and that baby will be 
followed by Dr. Wood for pediatrics. 

Educational Status:?MOB states that she graduated from high school and obtained training in a trade. FOB states that he did not graduate high school, but did obtain his GED while doing time in residential. FOB states that he also started working on a 
degree while incarcerated, but has not continued to pursue it. No issues with reading, learning or comprehension reported. 

Financial Status: Both parents are gainfully employed outside of the home. BLAKE is a , she is able to take 3 months off of work. BRONSON works for [employer]. 

Infant Supplies:?? Parents report that they have obtained all necessary baby supplies, including: car seat, safe sleep space, clothes, diapers and wipes. 

Childcare/Caregiver(s):?BLAKE will be the primary caregiver to baby along with BRONSON when he is not at work. When both parents are working they have grandparents that will be able to assist with childcare needs.  

Transportation:??BLAKE drives and has reliable means of transportation.  

Programs/Agencies Involved: ?BLAKE is connected to services provided by [employer] [facility] [employer] [facility], insurance and WIC. ?

Children Services/Legal Issues:??? No history of children services involvement. No issues or concerns warranting referral to be made at this time. 
- BRONSON states that he was incarcerated for two years in [facility] for an incident that happened with an ex-girlfriend. BRONSON states that he defended himself in a dispute, but because he did not plead not-guilty he was charged and had to residential time. 
BRONSON states that he completed probation and his record will be expunged in September of this year. 

Behavioral Health Issues: ??Mental Health History:?BRONSON denies mental health history. BLAKE states that she has been diagnosed with anxiety and depression when she was in high school. BLAKE also disclosed that while in high school she had thoughts of 
suicide and one suicidal attempt, at which point she was connected to mental health resources and supports and got help. BLAKE states that those mental health issues were situational and she has not struggled with anything like that since that time.? 
Substance Use History:?BLAKE states that she does not use drugs or alcohol. BLAKE states that at one time during her second trimester she took a gummy that she thought would help her with her nausea, but it was not until after she took it that she 
discovered it was THC. BLAKE states that she was also prescribed zofran to help with nausea. ? Family History:??Parents deny family history of addiction/ drug use or significant mental health diagnoses. ??? Drug Screens: Maternal drug screen at 
delivery was negative, baby drug screen at delivery was also negative. ??

Family/Social Stressors:? Parents deny any any issues or concerns at this time. 

Support Systems: Parents report that both sets of grandparents are supportive along with siblings and friends. 

Postpartum Depression/Shaken Baby/Safe Sleeping:? Sw educated parents of signs and symptoms of baby blues and postpartum depression and anxiety. Parents express understanding. MOB states that she feels good at this time and knows that if she were to 
struggle with her mental health during this postpartum period FOB would be able to recognize that and would know how to help and support her. Sw educated parents on shaken baby prevention and ABCs of safe sleep. Parents express understanding. 

ASSESSMENT:? MOB and baby are admitted following labor and delivery. MOB and FOB both participated in completion of psychosocial assessment. FOB was open regarding his past and criminal history. FOB states that MOB and baby are the best thing to 
happen to him. MOB has obtained all necessary baby supplies and has natural supports in place. Parents were receptive to  involvement and support. 

Safe Plan of Care for infant related to substance use:? MOB states that she does not use drugs/ substances prior to getting pregnant, and does not have any plans or intentions to use anything now that baby has been born. 

PLAN:?If baby's meconium results are positive for THC referral to [facility] will be made. MOB and baby to be discharged when medically ready.  

?No other services requested or indicated.

Lisa Mckeon, MSW, LSW

## 2024-03-08 NOTE — PN.OBGYN_ITS
Subjective    
Subjective    
Doing well. Ambulating and voiding without difficulty. Good voiding. Itching   
improved. Mostly on feet now. CBC pending this am    
    
    
Objective Data    
Objective Data    
Vital Signs:     
Vital Signs    
    
    
    
Temp Pulse Resp BP Pulse Ox O2 Del Method    
     
 98.2 F   98   16   94/62   95   Room Air     
     
 24 05:43  24 05:43  24 05:43  24 05:43  24 05:43   
24 05:43    
    
    
    
    
Oxygen Delivery Method           Room Air                                         
       
Weight:                          76.771 kg                                        
       
Body Mass Index (BMI)            30.9                                             
       
    
    
Intake & Output:     
                       Intake and Output for Last 24 Hours    
    
    
    
 24    
    
 23:59 23:59 23:59    
     
Intake Total 9.07 / 9.07 5697.07 / 5697.07 836.67 / 836.67    
     
Output Total  1500 / 1500 400 / 400    
     
Balance 9.07 / 9.07 4197.07 / 4197.07 436.67 / 436.67    
    
    
    
Lab / Micro Data    
    
                                                        24 20:20              
    
    
ROS    
Constitutional    
Constitutional: Denies fatigue, fever(s) or malaise    
Eyes    
Eyes: Denies change in vision    
ENT    
HEENT: Denies dizziness or headache(s)    
Cardiovascular    
Cardiovascular: Denies chest pain, dyspnea or lightheadedness    
Respiratory/Chest    
Respiratory/Chest: Denies cough or dyspnea    
Gastrointestinal    
Gastrointestinal: Denies change in bowel habits    
Genitourinary    
Genitourinary: Denies burning urination or genital lesions    
Integumentary    
Integumentary: Denies rash    
Neurologic    
Neurologic: Denies confusion, dizziness, headache(s), numbness or weakness    
    
Physical Exam    
Const    
alert    
General Appearance: cooperative    
GI    
GI Narrative:     
soft, moderate distention, fundus firm, appropriately tender.  Abdominal bandage  
clean dry and intact    
    
Assessment & Plan    
(1) S/P primary low transverse :     
PLAN: CBC pending    
(2) Failure of fetal descent in labor, delivered, current hospitalization:     
(3) Cholestasis during pregnancy:     
QUALIFIERS:               Trimester: third trimester  Qualified Code(s): O26.613  
- Liver and biliary tract disorders in pregnancy, third trimester; K83.1 -   
Obstruction of bile duct    
(4) POTS (postural orthostatic tachycardia syndrome):     
PLAN:     
Plan    
Routine post partum care.

## 2024-03-09 VITALS
RESPIRATION RATE: 14 BRPM | HEART RATE: 92 BPM | SYSTOLIC BLOOD PRESSURE: 102 MMHG | DIASTOLIC BLOOD PRESSURE: 70 MMHG | TEMPERATURE: 97.4 F | OXYGEN SATURATION: 97 %

## 2024-03-09 VITALS
DIASTOLIC BLOOD PRESSURE: 68 MMHG | HEART RATE: 80 BPM | TEMPERATURE: 97.3 F | OXYGEN SATURATION: 98 % | RESPIRATION RATE: 16 BRPM | SYSTOLIC BLOOD PRESSURE: 112 MMHG

## 2024-03-09 NOTE — PN.OBGYN_ITS
Subjective    
Subjective    
Pain controlled    
    
Objective Data    
Objective Data    
Vital Signs:     
Vital Signs    
    
    
    
Temp Pulse Resp BP Pulse Ox O2 Del Method    
     
 97.3 F L  80   16   112/68   98   Room Air     
     
 24 08:14  24 08:14  24 08:14  24 08:14  24 08:14   
24 08:14    
    
    
    
    
Oxygen Delivery Method           Room Air                                         
       
Weight:                          169 lb 4 oz                                      
       
Body Mass Index (BMI)            30.9                                             
       
    
    
Intake & Output:     
                       Intake and Output for Last 24 Hours    
    
    
    
 24    
    
 23:59 23:59 23:59    
     
Intake Total 5697.07 / 5697.07 836.67 / 836.67     
     
Output Total 1500 / 1500 1400 / 1400     
     
Balance 4197.07 / 4197.07 -563.33 / -563.33     
    
    
    
Lab / Micro Data    
    
                                                        24 06:55              
    
    
Physical Exam    
Const    
alert, oriented x3 and no apparent distress    
HEENT    
normocephalic    
GI    
soft to palpation, non-tender and non-distended    
GI Narrative:     
fundus firm, mid & below umbilicus    
Incision - bandage c/d/i    
Extremity    
normal to inspection and no calf tenderness    
    
Assessment & Plan    
(1) S/P primary low transverse :     
PLAN:     
Plan    
D/c home

## 2024-03-09 NOTE — PCM.DC.SUM
Providers
Date of Admission: 24
Date of Discharge: 24
Primary Care Physician: 
[facility]

Reason For Visit: PRIMARY 

Diagnosis
Discharge Diagnosis
(1) S/P primary low transverse : 
      Status: Acute
      Code(s):
Z98.891 - History of uterine scar from previous surgery

Plan
D/c home

Medications at Discharge
Home Medications

PNV#14-iron fum-FA#1-dha-docusate 27 mg iron-1 mg-300 mg-50 mg capsule 1 cap PO DAILY Pregnancy 24 
acetaminophen 500 mg tablet 1,000 mg (2 x 500 mg) PO Q6H #30 tabs 24 
ibuprofen 600 mg tablet 600 mg PO Q6H #30 tabs 24 
polysaccharide iron complex 150 mg iron capsule (Ferrex) 150 mg PO DAILY #30 caps 24 



Hospital Course
Operations
 section
Summary of Care Provided
Minutes Spent on Discharge: 15

Weight / BMI
Weight

Weight:                        169 lb 4 oz                                       
Body Mass Index (BMI)          30.9                                              



ABG / Lab / Microbiology Data

24 06:55          


D/C Instructions
Discharge Diet: No restrictions
Discharge Activity: May Shower
May resume sexual activity in: 6 weeks
Weight Bearing Status: Weight bearing as tolerated
Call your doctor if your incision/area has: Continuous Slow Oozing, Sudden Increased Bleeding, Increased Pain/ Swelling, Increased Redness, Foul Smelling Discharge and Swelling at the incision site
Call your doctor if you observe: Fever of 101 or Higher, Coldness, Increased Pain, Change in Color, Inability to urinate, Inability to have a bowel movement, Using more than 1 pad per hour, Shortness of breath, Dizziness, Fainting spells, Chest 
pain, Increased palpitations (irregular heartbeat), Calf discomfort and Uncontrolled pain
Suture Line Care: Avoid Pulling/Pushing and Avoid Pinching/Bending
Remove Dressing in: 1 week
Cleanse incision/area with: Soap & Water
Please Follow Up With: Sheryl Magana MD
When: Follow up in 2 and 6 weeks for postpartum visits.

Meaningful Use Info
Meaningful Use Diagnoses (Choose all that apply): None applicable

Discharge Plan
Admission
Admit Date/Time: 24 18:55

Primary Reason for Your Visit:  section

Attending Provider: Marla Clifford

Primary Care Provider: Care Physician,No Primary

Discharge Orders/Prescriptions
Prescriptions:
New
  polysaccharide iron complex [Ferrex 150] 150 mg iron Capsule 
   150 mg PO DAILY Qty: 30 1RF
  acetaminophen 500 mg Tablet 
   1,000 mg PO Q6H Qty: 30 0RF
  ibuprofen 600 mg Tablet 
   600 mg PO Q6H Qty: 30 0RF

Continued
  PNV #14-iron-FA#1-dha-docusate 27 mg iron-1 mg -300 mg-50 mg capsule 
   1 cap PO DAILY 

Discontinued
  aspirin [Hannah Chewable Aspirin] 81 mg tablet,chewable 
   1 tab PO DAILY 

Referrals / Follow Up:
Care Physician,No Primary [Primary Care Provider] - 

Disposition
Disposition (needs filled in before D/C Order can be placed): Home, Self Care

## 2024-09-20 ENCOUNTER — OFFICE VISIT (OUTPATIENT)
Dept: URGENT CARE | Facility: CLINIC | Age: 24
End: 2024-09-20
Payer: COMMERCIAL

## 2024-09-20 VITALS
RESPIRATION RATE: 16 BRPM | BODY MASS INDEX: 24.84 KG/M2 | OXYGEN SATURATION: 98 % | DIASTOLIC BLOOD PRESSURE: 71 MMHG | WEIGHT: 135 LBS | HEIGHT: 62 IN | HEART RATE: 87 BPM | TEMPERATURE: 98.8 F | SYSTOLIC BLOOD PRESSURE: 108 MMHG

## 2024-09-20 DIAGNOSIS — H10.33 ACUTE BACTERIAL CONJUNCTIVITIS OF BOTH EYES: Primary | ICD-10-CM

## 2024-09-20 PROCEDURE — 2500000004 HC RX 250 GENERAL PHARMACY W/ HCPCS (ALT 636 FOR OP/ED): Performed by: NURSE PRACTITIONER

## 2024-09-20 PROCEDURE — 96372 THER/PROPH/DIAG INJ SC/IM: CPT | Performed by: NURSE PRACTITIONER

## 2024-09-20 PROCEDURE — 99213 OFFICE O/P EST LOW 20 MIN: CPT | Performed by: NURSE PRACTITIONER

## 2024-09-20 RX ORDER — TOBRAMYCIN AND DEXAMETHASONE 3; 1 MG/ML; MG/ML
SUSPENSION/ DROPS OPHTHALMIC
Qty: 5 ML | Refills: 0 | Status: SHIPPED | OUTPATIENT
Start: 2024-09-20

## 2024-09-20 ASSESSMENT — VISUAL ACUITY: OU: 1

## 2024-09-20 NOTE — PROGRESS NOTES
24 y.o. female presents for evaluation of bilateral eye redness and upper eyelid swelling that began this morning. States she had acrylic nails put on yesterday. She states she took out both of her contacts last night after she got her nails put on. Denies any known foreign body or injury. No known new exposures. No drainage, fever, visual changes, n/v, URI symptoms or any other associated symptoms. No otc meds for symptoms. Pt states she is breastfeeding.      Vitals:    09/20/24 1308   BP: 108/71   Pulse: 87   Resp: 16   Temp: 37.1 °C (98.8 °F)   SpO2: 98%       Allergies   Allergen Reactions    Grass Pollen Other     sneezing and congestion    Animal Dander Other, Rash and Swelling     throat swelling       Medication Documentation Review Audit       Reviewed by Romario Beard MA (Medical Assistant) on 09/20/24 at 1309      Medication Order Taking? Sig Documenting Provider Last Dose Status            No Medications to Display                                   Past Medical History:   Diagnosis Date    Anorexia     Anorexia    Other conditions influencing health status     Menarche       Past Surgical History:   Procedure Laterality Date    OTHER SURGICAL HISTORY  11/15/2019    Tooth extraction       ROS  See HPI    Physical Exam  Vitals and nursing note reviewed.   Constitutional:       Appearance: Normal appearance.   HENT:      Head: Normocephalic and atraumatic.   Eyes:      General: Lids are everted, no foreign bodies appreciated. Vision grossly intact.      Conjunctiva/sclera:      Right eye: Right conjunctiva is injected. No chemosis or exudate.     Left eye: Left conjunctiva is injected. No chemosis or exudate.     Comments: Bilateral upper eyelids with mild swelling, L>R   Skin:     General: Skin is warm and dry.   Neurological:      General: No focal deficit present.      Mental Status: She is alert and oriented to person, place, and time.   Psychiatric:         Mood and Affect: Mood normal.          Behavior: Behavior normal.           Assessment/Plan/MDM  Leola was seen today for eye problem.  Diagnoses and all orders for this visit:  Acute bacterial conjunctivitis of both eyes (Primary)  -     tobramycin-dexamethasone (Tobradex) ophthalmic suspension; Administer 1 drop into both eyes every 2 hours while awake for 2 days and then 1 drop into both eyes 3 times daily x 5 days for a total of 7 days  -     methylPREDNISolone sod succinate (SOLU-Medrol) injection 125 mg    125 mg IM solu-medrol given in office, tolerated well, discharged 15 min post injection in stable condition. Pt breastfeeding so will attempt to not treat with PO course of steroids.  Patient's clinical presentation is otherwise unremarkable at this time. Patient is discharged with instructions to follow-up with primary care or seek emergency medical attention for worsening symptoms or any new concerns.      I did personally review Leola's past medical history, surgical history, social history, as well as family history (when relevant).  In this case, I also oversaw the her drug management by reviewing her medication list, allergy list, as well as the medications that I prescribed during the UC course and/or recommended as an out-patient (including possible OTC medications such as acetaminophen, NSAIDs , etc).    After reviewing the items above, I did not look at previous medical documentation, such as recent hospitalizations, office visits, and/or recent consultations with PCP/specialist.                          SDOH:   Another factor that I considered in Leola's care was her Social Determinants of Health (SDOH). During this UC encounter, she did not have social determinants of health. Those SDOH influencing Leola's care are: none      Juan Daniel Almanza CNP  [facility]  [phone]